# Patient Record
Sex: MALE | Race: WHITE | URBAN - METROPOLITAN AREA
[De-identification: names, ages, dates, MRNs, and addresses within clinical notes are randomized per-mention and may not be internally consistent; named-entity substitution may affect disease eponyms.]

---

## 2019-08-07 ENCOUNTER — HOSPITAL ENCOUNTER (OUTPATIENT)
Dept: LAB | Age: 69
Discharge: HOME | End: 2019-08-07

## 2019-08-07 ENCOUNTER — OFFICE VISIT (OUTPATIENT)
Dept: DERMATOLOGY | Facility: AMBULATORY SURGERY CENTER | Age: 69
End: 2019-08-07

## 2019-08-07 VITALS
SYSTOLIC BLOOD PRESSURE: 120 MMHG | OXYGEN SATURATION: 98 % | RESPIRATION RATE: 18 BRPM | TEMPERATURE: 97.6 F | BODY MASS INDEX: 30.53 KG/M2 | HEART RATE: 64 BPM | HEIGHT: 66 IN | DIASTOLIC BLOOD PRESSURE: 70 MMHG | WEIGHT: 190 LBS

## 2019-08-07 DIAGNOSIS — Z12.83 SCREENING FOR MALIGNANT NEOPLASM OF SKIN: ICD-10-CM

## 2019-08-07 DIAGNOSIS — D48.5 NEOPLASM OF UNCERTAIN BEHAVIOR OF SKIN OF SHOULDER: Primary | ICD-10-CM

## 2019-08-07 DIAGNOSIS — L81.4 LENTIGINES: ICD-10-CM

## 2019-08-07 DIAGNOSIS — L85.3 XEROSIS CUTIS: ICD-10-CM

## 2019-08-07 DIAGNOSIS — D18.01 CHERRY ANGIOMA: ICD-10-CM

## 2019-08-07 DIAGNOSIS — L82.1 SEBORRHEIC KERATOSES: ICD-10-CM

## 2019-08-07 DIAGNOSIS — L57.0 ACTINIC KERATOSIS: ICD-10-CM

## 2019-08-07 RX ORDER — CHOLECALCIFEROL TAB 125 MCG (5000 UNIT) 125 MCG
TAB ORAL DAILY
COMMUNITY

## 2019-08-07 RX ORDER — METFORMIN HYDROCHLORIDE 500 MG/1
TABLET, EXTENDED RELEASE ORAL
Refills: 3 | COMMUNITY
Start: 2019-05-03

## 2019-08-07 RX ORDER — LANOLIN ALCOHOL/MO/W.PET/CERES
CREAM (GRAM) TOPICAL
Refills: 2 | COMMUNITY
Start: 2019-05-17

## 2019-08-07 RX ORDER — PRAVASTATIN SODIUM 20 MG/1
TABLET ORAL
COMMUNITY
Start: 2019-08-01

## 2019-08-07 RX ORDER — LISINOPRIL AND HYDROCHLOROTHIAZIDE 12.5; 2 MG/1; MG/1
TABLET ORAL
COMMUNITY
Start: 2019-06-02

## 2019-08-07 RX ORDER — FLUOROURACIL 50 MG/G
CREAM TOPICAL
Qty: 40 G | Refills: 0 | Status: SHIPPED | OUTPATIENT
Start: 2019-08-07

## 2019-08-07 NOTE — PROGRESS NOTES
Written by Junior Pretty, as dictated by Wendy Brown, [address]. Name: Alpa Rice       Age: 80 y.o. Date: 8/7/2019    Chief Complaint:   Chief Complaint   Patient presents with    Skin Exam     full skin exam. head/face       Subjective:    HPI  Mr. Alpa Rice is a 80 y.o. male who presents as a new patient to [facility] for a skin exam.  The patient was referred for this evaluation by his wife. The patient has not had a skin exam in the past and the patient does have current complaints related to his skin. The patient has lesions he is concerned about on the left upper forehead and left cheek. The patient says that the lesion on the forehead has itched for 1 month and is scaly. He reports that he feels the lesion on the left cheek while shaving. He denies bleeding from either of the lesions. He also notes a concerning lesion on the posterior scalp. He is feeling well and in his usual state of health today. He has no current illnesses, no other skin concerns. His allergies, medications, medical, and social history are reviewed by me today. The patient's pertinent skin history includes:  No personal or family Hx of skin cancer. 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 ROS: Constitutional: Negative.     Dermatological : positive for - skin lesion changes      Social History     Socioeconomic History    Marital status:      Spouse name: Not on file    Number of children: Not on file    Years of education: Not on file    Highest education level: Not on file   Occupational History    Not on file   Social Needs    Financial resource strain: Not on file    Food insecurity:     Worry: Not on file     Inability: Not on file    Transportation needs:     Medical: Not on file     Non-medical: Not on file   Tobacco Use    Smoking status: Never Smoker    Smokeless tobacco: Never Used   Substance and Sexual Activity    Alcohol use: Not Currently     Frequency: Never    Drug use: Never    Sexual activity: Not on file   Lifestyle    Physical activity:     Days per week: Not on file     Minutes per session: Not on file    Stress: Not on file   Relationships    Social connections:     Talks on phone: Not on file     Gets together: Not on file     Attends [employer] service: Not on file     Active member of club or organization: Not on file     Attends meetings of clubs or organizations: Not on file     Relationship status: Not on file    Intimate partner violence:     Fear of current or ex partner: Not on file     Emotionally abused: Not on file     Physically abused: Not on file     Forced sexual activity: Not on file   Other Topics Concern    Not on file   Social History Narrative    Not on file       History reviewed. No pertinent family history. Past Medical History:   Diagnosis Date    Essential hypertension     Leukemia ([facility] [address]     Sun-damaged skin        History reviewed. No pertinent surgical history. Current Outpatient Medications   Medication Sig Dispense Refill    pravastatin (PRAVACHOL) 20 mg tablet       metFORMIN ER (GLUCOPHAGE XR) 500 mg tablet TAKE 1 TABLET BY MOUTH EVERY DAY  3    lisinopril-hydroCHLOROthiazide (PRINZIDE, ZESTORETIC) 20-12.5 mg per tablet       cyanocobalamin 1,000 mcg tablet TAKE ONE TABLET BY MOUTH DAILY  2    cholecalciferol, VITAMIN D3, (VITAMIN D3) 5,000 unit tab tablet Take  by mouth daily. No Known Allergies      Objective:    Visit Vitals  /70 (BP 1 Location: Left arm, BP Patient Position: Sitting)   Pulse 64   Temp 97.6 °F (36.4 °C) (Oral)   Resp 18   Ht 5' 6\" (1.676 m)   Wt 190 lb (86.2 kg)   SpO2 98%   BMI 30.67 kg/m²       Alexis Whitney is a 80 y.o. male who appears well and in no distress. He is awake, alert, and oriented.   There is no preauricular, submandibular, or cervical lymphadenopathy. A skin examination was performed including his scalp, face (including eyelid), ears, neck, chest, back, abdomen, upper extremities (including digits/nails), lower extremities, breasts; genital skin was not examined. He has a 15 x 7 mm pink papule with dot of hemorrhagic crusting on the left shoulder concerning for BCC. He has scattered waxy macules and keratotic papules consistent with seborrheic keratoses, including the lesions of his concern on the posterior scalp. He has thin scaled actinic keratoses on the scalp and face (most on forehead, temples, lateral and mid cheeks), including the lesion of his concern on the left upper forehead, at the hairline. He has scattered red papules consistent with cherry angiomas. He has dry skin on the knees. He has lentigines on sun exposed areas. He has a skin toned pedunculated papule in the left axilla. Photos from today's visit:     Left shoulder      Assessment/Plan:  1. Neoplasm of Uncertain Behavior, left shoulder, favors BCC. The differential diagnoses were discussed. A shave biopsy was advised to sample this lesion. The procedure was reviewed and verbal and written consent were obtained. The risks of pain, bleeding, infection, and scar were discussed. The patient is aware that this is a sample and is intended for diagnosis and not therapy of the skin lesion. I performed the procedure. The site was cleansed and anesthetized with 1% Lidocaine with Epinephrine 1:100,000. A shave biopsy was performed to sample the lesion. Drysol was used for hemostasis. The wound was bandaged and care reviewed. The specimen was sent to pathology. I will contact the patient with the results and any further treatment that may be necessary. 2. Seborrheic keratoses. The diagnosis was reviewed and the patient was reassured that no treatment is needed for these benign lesions. 3. Actinic Keratoses.   The diagnosis of this precancerous lesion related to sun exposure was reviewed. The patient was given options for treatment including Blake-U vs Efudex. The patient opted for Efudex and was prescribed to use a thin layer BID for 4 weeks, taking a 1-2 week break at the mid portion if needed, and continuing treatment for another 2 weeks. Discussed side effects and that his wife will likely need to help with application on the scalp. 4. Cherry angiomas. The diagnosis was reviewed and the patient was reassured that no treatment is needed for these benign lesions. 5. Xerosis cutis. The patient was advised to apply vaseline or cream to damp skin on the dry areas 2-3 x a week. 6.Solar lentigos. The diagnosis and relationship to sun exposure was reviewed. Sun protection advised. Next skin exam :  1 year    This plan was reviewed with the patient and patient agrees. All questions were answered. This scribe documentation was reviewed by me and accurately reflects the examination and decisions made by me. [facility]   OFFICE PROCEDURE PROGRESS NOTE   Chart reviewed for the following:   Jorden ESPITIA, have reviewed the History, Physical and updated the Allergic reactions for Candi. TIME OUT performed immediately prior to start of procedure:   Rosy ESPITIA, have performed the following reviews on Candi   prior to the start of the procedure:     * Patient was identified by name and date of birth   * Agreement on procedure being performed was verified   * Risks and Benefits explained to the patient   * Procedure site verified and marked as necessary   * Patient was positioned for comfort   * Consent was signed and verified     Time: 9:20 AM  Date of procedure: 8/7/2019  Procedure performed by: Sandie Helm.  Elgin Michelle  Provider assisted by: Romina Jean LPN  Patient assisted by: self   How tolerated by patient: tolerated the procedure well with no complications   Comments: none

## 2019-08-07 NOTE — PROGRESS NOTES
Identified pt with two pt identifiers(name and ). Reviewed record in preparation for visit and have obtained necessary documentation. All patient medications has been reviewed. Chief Complaint   Patient presents with    Skin Exam     full skin exam. head/face       Health Maintenance Due   Topic    Hepatitis C Screening     DTaP/Tdap/Td series (1 - Tdap)    Shingrix Vaccine Age 50> (1 of 2)    FOBT Q 1 YEAR AGE 50-75     GLAUCOMA SCREENING Q2Y     Pneumococcal 65+ years (1 of 2 - PCV13)    Influenza Age 5 to Adult     MEDICARE YEARLY EXAM        Vitals:    19 0853   BP: 120/70   Pulse: 64   Resp: 18   Temp: 97.6 °F (36.4 °C)   TempSrc: Oral   SpO2: 98%   Weight: 86.2 kg (190 lb)   Height: 5' 6\" (1.676 m)   PainSc:   0 - No pain       Coordination of Care Questionnaire:   1) Have you been to an emergency room, urgent care, or hospitalized since your last visit?   no       2. Have seen or consulted any other health care provider since your last visit? NO    3) Do you have an Advanced Directive/ Living Will in place? NO  If yes, do we have a copy on file NO  If no, would you like information NO    Patient is accompanied by spouse I have received verbal consent from Lillian Begum to discuss any/all medical information while they are present in the room.

## 2019-08-09 ENCOUNTER — TELEPHONE (OUTPATIENT)
Dept: DERMATOLOGY | Facility: AMBULATORY SURGERY CENTER | Age: 69
End: 2019-08-09

## 2019-08-09 NOTE — TELEPHONE ENCOUNTER
Two pt identifiers confirmed. Pt called in regards to a miss called from NP JOEL PEREA [facility] about lab results. Informed pt to give office a call back Monday 08/12/19 to speak to NP. Pt verbalized understanding of information discussed w/ no further questions at this time.

## 2019-08-12 NOTE — PROGRESS NOTES
I spoke with the pt and notified him of the report of SCCi. He is using 5-Fu on his face. I suggest he use this on the shoulder as well - twice daily for 4 weeks straight after the scabbing is gone. He agrees. F/up 6 months.

## 2019-08-12 NOTE — TELEPHONE ENCOUNTER
Patient's wife called returning phone call that was left on Friday. Asked if you could please call her  back on his cell phone.     Thanks,

## 2023-05-20 RX ORDER — METFORMIN HYDROCHLORIDE 500 MG/1
1 TABLET, EXTENDED RELEASE ORAL DAILY
COMMUNITY
Start: 2019-05-03

## 2023-05-20 RX ORDER — PRAVASTATIN SODIUM 20 MG
TABLET ORAL
COMMUNITY
Start: 2019-08-01

## 2023-05-20 RX ORDER — FLUOROURACIL 50 MG/G
CREAM TOPICAL
COMMUNITY
Start: 2019-08-07

## 2023-05-20 RX ORDER — LISINOPRIL AND HYDROCHLOROTHIAZIDE 20; 12.5 MG/1; MG/1
TABLET ORAL
COMMUNITY
Start: 2019-06-02